# Patient Record
Sex: FEMALE | Race: WHITE | NOT HISPANIC OR LATINO | Employment: OTHER | URBAN - METROPOLITAN AREA
[De-identification: names, ages, dates, MRNs, and addresses within clinical notes are randomized per-mention and may not be internally consistent; named-entity substitution may affect disease eponyms.]

---

## 2018-02-17 ENCOUNTER — HOSPITAL ENCOUNTER (OUTPATIENT)
Facility: HOSPITAL | Age: 53
Discharge: HOME | End: 2018-02-18
Attending: EMERGENCY MEDICINE | Admitting: HOSPITALIST
Payer: COMMERCIAL

## 2018-02-17 DIAGNOSIS — R06.09 DOE (DYSPNEA ON EXERTION): ICD-10-CM

## 2018-02-17 DIAGNOSIS — I20.9 ANGINA PECTORIS: Primary | ICD-10-CM

## 2018-02-17 DIAGNOSIS — R07.9 CHEST PAIN: ICD-10-CM

## 2018-02-17 PROBLEM — F17.200 TOBACCO DEPENDENCY: Status: ACTIVE | Noted: 2018-02-17

## 2018-02-17 PROBLEM — E78.5 HYPERLIPIDEMIA: Status: ACTIVE | Noted: 2018-02-17

## 2018-02-17 LAB
ALBUMIN SERPL BCP-MCNC: 3.8 G/DL
ALP SERPL-CCNC: 77 U/L
ALT SERPL W/O P-5'-P-CCNC: 35 U/L
ANION GAP SERPL CALC-SCNC: 9 MMOL/L
AST SERPL-CCNC: 17 U/L
BASOPHILS # BLD AUTO: 0.1 K/UL
BASOPHILS NFR BLD: 0.9 %
BILIRUB SERPL-MCNC: 0.3 MG/DL
BUN SERPL-MCNC: 7 MG/DL
CALCIUM SERPL-MCNC: 9.4 MG/DL
CHLORIDE SERPL-SCNC: 111 MMOL/L
CHOLEST SERPL-MCNC: 287 MG/DL
CHOLEST/HDLC SERPL: 8 {RATIO}
CO2 SERPL-SCNC: 23 MMOL/L
CREAT SERPL-MCNC: 0.9 MG/DL
D DIMER PPP IA.FEU-MCNC: 0.24 MG/L FEU
DIFFERENTIAL METHOD: ABNORMAL
EOSINOPHIL # BLD AUTO: 0.3 K/UL
EOSINOPHIL NFR BLD: 5 %
ERYTHROCYTE [DISTWIDTH] IN BLOOD BY AUTOMATED COUNT: 13.8 %
EST. GFR  (AFRICAN AMERICAN): >60 ML/MIN/1.73 M^2
EST. GFR  (NON AFRICAN AMERICAN): >60 ML/MIN/1.73 M^2
GLUCOSE SERPL-MCNC: 120 MG/DL
HCT VFR BLD AUTO: 45.9 %
HDLC SERPL-MCNC: 36 MG/DL
HDLC SERPL: 12.5 %
HGB BLD-MCNC: 15.2 G/DL
LDLC SERPL CALC-MCNC: 191 MG/DL
LYMPHOCYTES # BLD AUTO: 2.5 K/UL
LYMPHOCYTES NFR BLD: 38.4 %
MCH RBC QN AUTO: 29.2 PG
MCHC RBC AUTO-ENTMCNC: 33 G/DL
MCV RBC AUTO: 88 FL
MONOCYTES # BLD AUTO: 0.4 K/UL
MONOCYTES NFR BLD: 6.1 %
NEUTROPHILS # BLD AUTO: 3.3 K/UL
NEUTROPHILS NFR BLD: 49.6 %
NONHDLC SERPL-MCNC: 251 MG/DL
PLATELET # BLD AUTO: 293 K/UL
PMV BLD AUTO: 7.9 FL
POTASSIUM SERPL-SCNC: 3.7 MMOL/L
PROT SERPL-MCNC: 7 G/DL
RBC # BLD AUTO: 5.2 M/UL
SODIUM SERPL-SCNC: 143 MMOL/L
TRIGL SERPL-MCNC: 300 MG/DL
TROPONIN I SERPL DL<=0.01 NG/ML-MCNC: <0.006 NG/ML
TSH SERPL DL<=0.005 MIU/L-ACNC: 1.04 UIU/ML
WBC # BLD AUTO: 6.6 K/UL

## 2018-02-17 PROCEDURE — 85025 COMPLETE CBC W/AUTO DIFF WBC: CPT

## 2018-02-17 PROCEDURE — 80053 COMPREHEN METABOLIC PANEL: CPT

## 2018-02-17 PROCEDURE — 93010 ELECTROCARDIOGRAM REPORT: CPT | Mod: ,,, | Performed by: INTERNAL MEDICINE

## 2018-02-17 PROCEDURE — 36415 COLL VENOUS BLD VENIPUNCTURE: CPT

## 2018-02-17 PROCEDURE — 84443 ASSAY THYROID STIM HORMONE: CPT

## 2018-02-17 PROCEDURE — 80061 LIPID PANEL: CPT

## 2018-02-17 PROCEDURE — G0378 HOSPITAL OBSERVATION PER HR: HCPCS

## 2018-02-17 PROCEDURE — 85379 FIBRIN DEGRADATION QUANT: CPT

## 2018-02-17 PROCEDURE — 25000003 PHARM REV CODE 250: Performed by: EMERGENCY MEDICINE

## 2018-02-17 PROCEDURE — 99284 EMERGENCY DEPT VISIT MOD MDM: CPT

## 2018-02-17 PROCEDURE — 93005 ELECTROCARDIOGRAM TRACING: CPT

## 2018-02-17 PROCEDURE — 84484 ASSAY OF TROPONIN QUANT: CPT | Mod: 91

## 2018-02-17 PROCEDURE — 25000003 PHARM REV CODE 250: Performed by: HOSPITALIST

## 2018-02-17 RX ORDER — POTASSIUM CHLORIDE 20 MEQ/15ML
40 SOLUTION ORAL
Status: DISCONTINUED | OUTPATIENT
Start: 2018-02-17 | End: 2018-02-18 | Stop reason: HOSPADM

## 2018-02-17 RX ORDER — NAPROXEN SODIUM 220 MG/1
81 TABLET, FILM COATED ORAL DAILY
Status: DISCONTINUED | OUTPATIENT
Start: 2018-02-17 | End: 2018-02-18 | Stop reason: HOSPADM

## 2018-02-17 RX ORDER — SODIUM,POTASSIUM PHOSPHATES 280-250MG
2 POWDER IN PACKET (EA) ORAL
Status: DISCONTINUED | OUTPATIENT
Start: 2018-02-17 | End: 2018-02-18 | Stop reason: HOSPADM

## 2018-02-17 RX ORDER — LANOLIN ALCOHOL/MO/W.PET/CERES
800 CREAM (GRAM) TOPICAL
Status: DISCONTINUED | OUTPATIENT
Start: 2018-02-17 | End: 2018-02-18 | Stop reason: HOSPADM

## 2018-02-17 RX ORDER — NITROGLYCERIN 0.4 MG/1
0.4 TABLET SUBLINGUAL EVERY 5 MIN PRN
Status: DISCONTINUED | OUTPATIENT
Start: 2018-02-17 | End: 2018-02-18 | Stop reason: HOSPADM

## 2018-02-17 RX ORDER — IBUPROFEN 200 MG
16 TABLET ORAL
Status: DISCONTINUED | OUTPATIENT
Start: 2018-02-17 | End: 2018-02-18 | Stop reason: HOSPADM

## 2018-02-17 RX ORDER — IBUPROFEN 200 MG
24 TABLET ORAL
Status: DISCONTINUED | OUTPATIENT
Start: 2018-02-17 | End: 2018-02-18 | Stop reason: HOSPADM

## 2018-02-17 RX ORDER — PANTOPRAZOLE SODIUM 40 MG/1
40 TABLET, DELAYED RELEASE ORAL DAILY
Status: DISCONTINUED | OUTPATIENT
Start: 2018-02-17 | End: 2018-02-18 | Stop reason: HOSPADM

## 2018-02-17 RX ORDER — GLUCAGON 1 MG
1 KIT INJECTION
Status: DISCONTINUED | OUTPATIENT
Start: 2018-02-17 | End: 2018-02-18 | Stop reason: HOSPADM

## 2018-02-17 RX ORDER — SODIUM CHLORIDE 0.9 % (FLUSH) 0.9 %
5 SYRINGE (ML) INJECTION
Status: DISCONTINUED | OUTPATIENT
Start: 2018-02-17 | End: 2018-02-18 | Stop reason: HOSPADM

## 2018-02-17 RX ORDER — ACETAMINOPHEN 325 MG/1
650 TABLET ORAL EVERY 6 HOURS PRN
Status: DISCONTINUED | OUTPATIENT
Start: 2018-02-17 | End: 2018-02-18 | Stop reason: HOSPADM

## 2018-02-17 RX ORDER — ONDANSETRON 2 MG/ML
8 INJECTION INTRAMUSCULAR; INTRAVENOUS EVERY 8 HOURS PRN
Status: DISCONTINUED | OUTPATIENT
Start: 2018-02-17 | End: 2018-02-18 | Stop reason: HOSPADM

## 2018-02-17 RX ORDER — ONDANSETRON 2 MG/ML
4 INJECTION INTRAMUSCULAR; INTRAVENOUS EVERY 8 HOURS PRN
Status: DISCONTINUED | OUTPATIENT
Start: 2018-02-17 | End: 2018-02-18 | Stop reason: HOSPADM

## 2018-02-17 RX ORDER — REGADENOSON 0.08 MG/ML
0.4 INJECTION, SOLUTION INTRAVENOUS ONCE
Status: DISCONTINUED | OUTPATIENT
Start: 2018-02-17 | End: 2018-02-18 | Stop reason: HOSPADM

## 2018-02-17 RX ADMIN — ASPIRIN 81 MG CHEWABLE TABLET 81 MG: 81 TABLET CHEWABLE at 01:02

## 2018-02-17 RX ADMIN — ACETAMINOPHEN 650 MG: 325 TABLET, FILM COATED ORAL at 08:02

## 2018-02-17 RX ADMIN — NITROGLYCERIN 1 INCH: 20 OINTMENT TOPICAL at 10:02

## 2018-02-17 RX ADMIN — PANTOPRAZOLE SODIUM 40 MG: 40 TABLET, DELAYED RELEASE ORAL at 01:02

## 2018-02-17 RX ADMIN — ACETAMINOPHEN 650 MG: 325 TABLET, FILM COATED ORAL at 01:02

## 2018-02-17 NOTE — ASSESSMENT & PLAN NOTE
Dangers of cigarette smoking were reviewed with patient in detail for 10 minutes and patient was encouraged to quit. Nicotine replacement options were discussed.

## 2018-02-17 NOTE — LETTER
February 18, 2018    Natalee Haynes  [address] [place] [address]            [address] [place] [id number]  Phone: [phone]  Fax: [phone] February 18, 2018     Patient: Natalee Haynes   YOB: 1965   Date of Visit: 2/17/2018       To Whom It May Concern:    It is my medical opinion that Natalee Haynes may return to full duty immediately with no restrictions on 02/22/18.    If you have any questions or concerns, please don't hesitate to call.    Sincerely,        Lilo Washburn RN      negative

## 2018-02-17 NOTE — ED PROVIDER NOTES
"Encounter Date: 2018    SCRIBE #1 NOTE: I, Dyllan Alfaro, am scribing for, and in the presence of, Dr. Soto.       History     Chief Complaint   Patient presents with    Chest Pain     left chest, started a few days ago "twinges that felt like pressure"  starts pain radiates to neck and back that feels heavy. reports pressure 150/90 which is high for her       2018 10:03 AM     Chief complaint: Chest Pain      Natalee Haynes is a 52 y.o. female with a PMHx of Ovarian CA, Migraines, and reflux who presents to the ED c/o chest pain. She states the pain began last night and radiates into her neck and around her left chest. The pain last for a couple minutes. She endorses mild SOB. She denies nausea and vomiting. She states her blood pressure has been high recently in the 180s/100s. She reports smoking a half pack a day. He father  from a heart attack at 63. She is allergic to Phenergan.       The history is provided by the patient.     Review of patient's allergies indicates:   Allergen Reactions    Phenergan [promethazine]      Past Medical History:   Diagnosis Date    Migraine headache     Ovarian ca      Past Surgical History:   Procedure Laterality Date    CHOLECYSTECTOMY      HYSTERECTOMY       No family history on file.  Social History   Substance Use Topics    Smoking status: Not on file    Smokeless tobacco: Not on file    Alcohol use Not on file     Review of Systems   Constitutional: Negative for activity change, appetite change, chills, fatigue and fever.   Eyes: Negative for visual disturbance.   Respiratory: Positive for shortness of breath (mild). Negative for apnea.    Cardiovascular: Positive for chest pain. Negative for palpitations.   Gastrointestinal: Negative for abdominal distention, abdominal pain, nausea and vomiting.   Genitourinary: Negative for difficulty urinating.   Musculoskeletal: Negative for neck pain.   Skin: Negative for pallor and rash.   Neurological: " Negative for headaches.   Hematological: Does not bruise/bleed easily.   Psychiatric/Behavioral: Negative for agitation.       Physical Exam     Initial Vitals [02/17/18 0953]   BP Pulse Resp Temp SpO2   136/67 103 18 98.2 °F (36.8 °C) 100 %      MAP       90         Physical Exam    Nursing note and vitals reviewed.  Constitutional: She appears well-developed and well-nourished.   HENT:   Head: Normocephalic and atraumatic.   Eyes: Conjunctivae are normal.   Neck: Normal range of motion. Neck supple.   Cardiovascular: Regular rhythm and normal heart sounds. Tachycardia present.  Exam reveals no gallop and no friction rub.    No murmur heard.  Pulmonary/Chest: Effort normal and breath sounds normal. No respiratory distress. She has no wheezes. She has no rhonchi. She has no rales. She exhibits no tenderness.   Abdominal: Soft. She exhibits no distension. There is no tenderness.   Musculoskeletal: Normal range of motion.   Neurological: She is alert and oriented to person, place, and time.   Skin: Skin is warm and dry. No erythema.   Psychiatric: She has a normal mood and affect.         ED Course   Procedures  Labs Reviewed   CBC W/ AUTO DIFFERENTIAL   COMPREHENSIVE METABOLIC PANEL   D DIMER, QUANTITATIVE   TROPONIN I     EKG Readings: (Independently Interpreted)   Rhythm: Normal Sinus Rhythm. Heart Rate: 94. Ectopy: No Ectopy. Conduction: Normal. ST Segments: Non-Specific ST Segment Depression. T Waves: Normal. Axis: Normal. Clinical Impression: Normal Sinus Rhythm          Medical Decision Making:   History:   Old Medical Records: I decided to obtain old medical records.  Clinical Tests:   Lab Tests: Ordered and Reviewed  Radiological Study: Ordered and Reviewed  Medical Tests: Ordered and Reviewed  ED Management:  Natalee Haynes is a 52 y.o. female who presents with  intermittent retrosternal pain which radiates to her left neck.  She has multiple risk factors concerns for ACS.  EKG fails to demonstrate any  evidence of ischemia/MI with a negative troponin.  She will be admitted for serial enzymes.  There is no evidence of aortic dissection.  She has a negative d-dimer with no hypoxia or tachycardia with pulmonary emboli unlikely.  Other:   I have discussed this case with another health care provider.       <> Summary of the Discussion: Discussed with Dr. Rios who will admit the patient       APC / Resident Notes:   I, Dr. Jose R Soto III, personally performed the services described in this documentation. All medical record entries made by the scribe were at my direction and in my presence.  I have reviewed the chart and agree that the record reflects my personal performance and is accurate and complete. Jose R Soto III, MD.  11:25 AM 02/17/2018       Scribe Attestation:   Scribe #1: I performed the above scribed service and the documentation accurately describes the services I performed. I attest to the accuracy of the note.               Clinical Impression:   Diagnoses of Chest pain and Chest pain were pertinent to this visit.                           Jose R Soto III, MD  02/17/18 1935

## 2018-02-17 NOTE — ASSESSMENT & PLAN NOTE
Telemetry.   Nitro patch and  mg in ED. ASA 81 mg daily.   NPO for stress test in am.   Check echo.   Donato SL as needed.   Trend troponin.   ROMIE score 2. Recommend cardiac stress test due to early family death from MI.

## 2018-02-17 NOTE — HPI
"Natalee Haynes is a 52 y.o. female with a PMH  of Ovarian CA, Migraines, seasonally allergies and hyperlipidemia who presents to the ED for evaluation of midsteral chest "pressure... Heaviness...twinges 6/10" radiating to the left breast and neck intermittently for 2 days. She reports mild dyspnea associated with the "chest heaviness". She denies increased pain with exertion or dizziness. She states increased blood pressure, 180/100s  for the last couple of days. She reports increased stress at work. She did not take any medications prior to ED evaluation for alleviation of symptoms. Patient with cardiac risk factors: father early death with MI, Hyperlipidemia, smoker, and likely hypertension.   "

## 2018-02-17 NOTE — H&P
"[facility] Hospital Medicine  History & Physical    Patient Name: Natalee Haynes  MRN: [id number]  Admission Date: 2/17/2018  Attending Physician: Tamera Red MD   Primary Care Provider: Primary Doctor No         Patient information was obtained from patient and ER records.     Subjective:     Principal Problem:Angina pectoris    Chief Complaint:   Chief Complaint   Patient presents with    Chest Pain     left chest, started a few days ago "twinges that felt like pressure"  starts pain radiates to neck and back that feels heavy. reports pressure 150/90 which is high for her        HPI: Natalee Haynes is a 52 y.o. female with a PMH  of Ovarian CA, Migraines, seasonally allergies and hyperlipidemia who presents to the ED for evaluation of midsteral chest "pressure... Heaviness...twinges 6/10" radiating to the left breast and neck intermittently for 2 days. She reports mild dyspnea associated with the "chest heaviness". She denies increased pain with exertion or dizziness. She states increased blood pressure, 180/100s  for the last couple of days. She reports increased stress at work. She did not take any medications prior to ED evaluation for alleviation of symptoms. Patient with cardiac risk factors: father early death with MI, Hyperlipidemia, smoker, and likely hypertension.     Past Medical History:   Diagnosis Date    Hyperlipemia     Migraine headache     Ovarian ca        Past Surgical History:   Procedure Laterality Date    CHOLECYSTECTOMY      HYSTERECTOMY         Review of patient's allergies indicates:   Allergen Reactions    Phenergan [promethazine]        No current facility-administered medications on file prior to encounter.      No current outpatient prescriptions on file prior to encounter.     Family History     Problem Relation (Age of Onset)    Diabetes Mother    Early death Mother, Father    Heart disease Father    Hyperlipidemia Father        Social History Main " Topics    Smoking status: Current Every Day Smoker     Packs/day: 0.50     Years: 35.00     Start date: 2/17/1980    Smokeless tobacco: Never Used    Alcohol use Yes      Comment: occasionally    Drug use: No    Sexual activity: Not on file     Review of Systems   Constitutional: Negative for diaphoresis, fatigue and fever.   HENT: Positive for postnasal drip. Negative for congestion and rhinorrhea.    Eyes: Negative for photophobia and visual disturbance.   Respiratory: Positive for shortness of breath. Negative for cough.    Cardiovascular: Positive for chest pain. Negative for leg swelling.   Gastrointestinal: Negative for abdominal distention and abdominal pain.   Endocrine: Negative for polyphagia and polyuria.   Genitourinary: Negative for dysuria and flank pain.   Musculoskeletal: Negative for arthralgias and gait problem.   Skin: Negative for rash.   Neurological: Negative for speech difficulty and numbness.     Objective:     Vital Signs (Most Recent):  Temp: 97.7 °F (36.5 °C) (02/17/18 1300)  Pulse: 96 (02/17/18 1308)  Resp: 16 (02/17/18 1300)  BP: 102/60 (02/17/18 1300)  SpO2: 97 % (02/17/18 1300) Vital Signs (24h Range):  Temp:  [97.7 °F (36.5 °C)-98.2 °F (36.8 °C)] 97.7 °F (36.5 °C)  Pulse:  [] 96  Resp:  [16-18] 16  SpO2:  [93 %-100 %] 97 %  BP: (102-150)/(56-80) 102/60     Weight: 84.2 kg (185 lb 10 oz)  Body mass index is 26.63 kg/m².    Physical Exam   Constitutional: She is oriented to person, place, and time. She appears well-developed and well-nourished.   HENT:   Head: Normocephalic and atraumatic.   Right Ear: External ear normal.   Left Ear: External ear normal.   Mouth/Throat: Oropharynx is clear and moist.   Eyes: Conjunctivae and EOM are normal. Pupils are equal, round, and reactive to light.   Neck: Normal range of motion. Neck supple.   Cardiovascular: Normal rate, regular rhythm, normal heart sounds and intact distal pulses.    No murmur heard.  Pulmonary/Chest: Effort normal  and breath sounds normal.   Abdominal: Soft. Bowel sounds are normal. There is no tenderness.   Musculoskeletal: Normal range of motion. She exhibits no edema.   Neurological: She is alert and oriented to person, place, and time. No cranial nerve deficit.   Skin: Skin is warm and dry.   Psychiatric: She has a normal mood and affect. Her behavior is normal. Judgment normal.   Nursing note and vitals reviewed.        CRANIAL NERVES     CN III, IV, VI   Pupils are equal, round, and reactive to light.  Extraocular motions are normal.        Significant Labs:   BMP:   Recent Labs  Lab 02/17/18  1034   *      K 3.7   *   CO2 23   BUN 7   CREATININE 0.9   CALCIUM 9.4     CBC:   Recent Labs  Lab 02/17/18  1034   WBC 6.60   HGB 15.2   HCT 45.9        Lipid Panel: No results for input(s): CHOL, HDL, LDLCALC, TRIG, CHOLHDL in the last 48 hours.  Troponin:   Recent Labs  Lab 02/17/18  1034   TROPONINI <0.006       Significant Imaging: I have reviewed and interpreted all pertinent imaging results/findings within the past 24 hours.    Assessment/Plan:     * Angina pectoris    Telemetry.   Nitro patch and  mg in ED. ASA 81 mg daily.   NPO for stress test in am.   Check echo.   Donato SL as needed.   Trend troponin.   ROMIE score 3. Recommend cardiac stress test due to early family death from MI.          Tobacco dependency    Dangers of cigarette smoking were reviewed with patient in detail for 10 minutes and patient was encouraged to quit. Nicotine replacement options were discussed.            Hyperlipidemia      Check lipids. Currently not on pharmacological agent.             VTE Risk Mitigation         Ordered     Low Risk of VTE  Once      02/17/18 1304         Discussed POC with patient.   Time spent seeing patient( greater than 1/2 spent in direct contact) : 60 min    Melissa Garibay NP  Department of Hospital Medicine   [facility]

## 2018-02-17 NOTE — SUBJECTIVE & OBJECTIVE
Past Medical History:   Diagnosis Date    Hyperlipemia     Migraine headache     Ovarian ca        Past Surgical History:   Procedure Laterality Date    CHOLECYSTECTOMY      HYSTERECTOMY         Review of patient's allergies indicates:   Allergen Reactions    Phenergan [promethazine]        No current facility-administered medications on file prior to encounter.      No current outpatient prescriptions on file prior to encounter.     Family History     Problem Relation (Age of Onset)    Diabetes Mother    Early death Mother, Father    Heart disease Father    Hyperlipidemia Father        Social History Main Topics    Smoking status: Current Every Day Smoker     Packs/day: 0.50     Years: 35.00     Start date: 2/17/1980    Smokeless tobacco: Never Used    Alcohol use Yes      Comment: occasionally    Drug use: No    Sexual activity: Not on file     Review of Systems   Constitutional: Negative for diaphoresis, fatigue and fever.   HENT: Positive for postnasal drip. Negative for congestion and rhinorrhea.    Eyes: Negative for photophobia and visual disturbance.   Respiratory: Positive for shortness of breath. Negative for cough.    Cardiovascular: Positive for chest pain. Negative for leg swelling.   Gastrointestinal: Negative for abdominal distention and abdominal pain.   Endocrine: Negative for polyphagia and polyuria.   Genitourinary: Negative for dysuria and flank pain.   Musculoskeletal: Negative for arthralgias and gait problem.   Skin: Negative for rash.   Neurological: Negative for speech difficulty and numbness.     Objective:     Vital Signs (Most Recent):  Temp: 97.7 °F (36.5 °C) (02/17/18 1300)  Pulse: 96 (02/17/18 1308)  Resp: 16 (02/17/18 1300)  BP: 102/60 (02/17/18 1300)  SpO2: 97 % (02/17/18 1300) Vital Signs (24h Range):  Temp:  [97.7 °F (36.5 °C)-98.2 °F (36.8 °C)] 97.7 °F (36.5 °C)  Pulse:  [] 96  Resp:  [16-18] 16  SpO2:  [93 %-100 %] 97 %  BP: (102-150)/(56-80) 102/60     Weight:  84.2 kg (185 lb 10 oz)  Body mass index is 26.63 kg/m².    Physical Exam   Constitutional: She is oriented to person, place, and time. She appears well-developed and well-nourished.   HENT:   Head: Normocephalic and atraumatic.   Right Ear: External ear normal.   Left Ear: External ear normal.   Mouth/Throat: Oropharynx is clear and moist.   Eyes: Conjunctivae and EOM are normal. Pupils are equal, round, and reactive to light.   Neck: Normal range of motion. Neck supple.   Cardiovascular: Normal rate, regular rhythm, normal heart sounds and intact distal pulses.    No murmur heard.  Pulmonary/Chest: Effort normal and breath sounds normal.   Abdominal: Soft. Bowel sounds are normal. There is no tenderness.   Musculoskeletal: Normal range of motion. She exhibits no edema.   Neurological: She is alert and oriented to person, place, and time. No cranial nerve deficit.   Skin: Skin is warm and dry.   Psychiatric: She has a normal mood and affect. Her behavior is normal. Judgment normal.   Nursing note and vitals reviewed.        CRANIAL NERVES     CN III, IV, VI   Pupils are equal, round, and reactive to light.  Extraocular motions are normal.        Significant Labs:   BMP:   Recent Labs  Lab 02/17/18  1034   *      K 3.7   *   CO2 23   BUN 7   CREATININE 0.9   CALCIUM 9.4     CBC:   Recent Labs  Lab 02/17/18  1034   WBC 6.60   HGB 15.2   HCT 45.9        Lipid Panel: No results for input(s): CHOL, HDL, LDLCALC, TRIG, CHOLHDL in the last 48 hours.  Troponin:   Recent Labs  Lab 02/17/18  1034   TROPONINI <0.006       Significant Imaging: I have reviewed and interpreted all pertinent imaging results/findings within the past 24 hours.

## 2018-02-18 VITALS
SYSTOLIC BLOOD PRESSURE: 137 MMHG | RESPIRATION RATE: 18 BRPM | BODY MASS INDEX: 26.57 KG/M2 | HEIGHT: 70 IN | TEMPERATURE: 97 F | HEART RATE: 84 BPM | DIASTOLIC BLOOD PRESSURE: 69 MMHG | WEIGHT: 185.63 LBS | OXYGEN SATURATION: 99 %

## 2018-02-18 LAB
DIASTOLIC DYSFUNCTION: NO
DIASTOLIC DYSFUNCTION: NO
GLOBAL PERICARDIAL EFFUSION: ABNORMAL
RETIRED EF AND QEF - SEE NOTES: 60 (ref 55–65)
TRICUSPID VALVE REGURGITATION: ABNORMAL

## 2018-02-18 PROCEDURE — 25000003 PHARM REV CODE 250: Performed by: EMERGENCY MEDICINE

## 2018-02-18 PROCEDURE — G0378 HOSPITAL OBSERVATION PER HR: HCPCS

## 2018-02-18 PROCEDURE — 63600175 PHARM REV CODE 636 W HCPCS: Performed by: INTERNAL MEDICINE

## 2018-02-18 PROCEDURE — 63600175 PHARM REV CODE 636 W HCPCS

## 2018-02-18 RX ORDER — LISINOPRIL 5 MG/1
5 TABLET ORAL DAILY
Qty: 90 TABLET | Refills: 1 | Status: SHIPPED | OUTPATIENT
Start: 2018-02-18 | End: 2019-02-18

## 2018-02-18 RX ORDER — NAPROXEN SODIUM 220 MG/1
81 TABLET, FILM COATED ORAL DAILY
Refills: 0 | COMMUNITY
Start: 2018-02-18 | End: 2019-02-18

## 2018-02-18 RX ORDER — ROSUVASTATIN CALCIUM 40 MG/1
40 TABLET, COATED ORAL NIGHTLY
Qty: 90 TABLET | Refills: 3 | Status: SHIPPED | OUTPATIENT
Start: 2018-02-18 | End: 2019-02-18

## 2018-02-18 RX ORDER — REGADENOSON 0.08 MG/ML
INJECTION, SOLUTION INTRAVENOUS
Status: COMPLETED
Start: 2018-02-18 | End: 2018-02-18

## 2018-02-18 RX ORDER — ROSUVASTATIN CALCIUM 20 MG/1
40 TABLET, COATED ORAL NIGHTLY
Status: DISCONTINUED | OUTPATIENT
Start: 2018-02-18 | End: 2018-02-18 | Stop reason: HOSPADM

## 2018-02-18 RX ORDER — ROSUVASTATIN CALCIUM 40 MG/1
40 TABLET, COATED ORAL NIGHTLY
Qty: 90 TABLET | Refills: 3 | Status: SHIPPED | OUTPATIENT
Start: 2018-02-18 | End: 2018-02-18

## 2018-02-18 RX ADMIN — HUMAN ALBUMIN MICROSPHERES AND PERFLUTREN 0.11 MG: 10; .22 INJECTION, SOLUTION INTRAVENOUS at 09:02

## 2018-02-18 RX ADMIN — ACETAMINOPHEN 650 MG: 325 TABLET, FILM COATED ORAL at 08:02

## 2018-02-18 NOTE — PLAN OF CARE
02/18/18 1501   Final Note   Assessment Type Final Discharge Note   Discharge Disposition Home

## 2018-02-18 NOTE — PLAN OF CARE
Problem: Patient Care Overview  Goal: Plan of Care Review  Outcome: Ongoing (interventions implemented as appropriate)  Pt remains NPO after MN for stress test today. No complaints of chest pain voiced during the night.  Call light within reach.

## 2018-02-18 NOTE — NURSING
Lexiscan cardiolite completed with Dr. Casillas. Patient tolerated well. VSS. Patient to radiology via  , report given to DENIZ. Nuclear images in progress.

## 2018-02-18 NOTE — NURSING
Discharge instructions went over with pt. Pt verbalizes understanding and has no new questions at this time. VSS. IV removed, catheter intact. Telemetry monitor removed. AVS printed and given to pt. Pt left via wheelchair

## 2018-02-18 NOTE — PLAN OF CARE
Patient reports independence, denies POA, living will or HH. Pharmacy is [facility], PCP is MARCI Wilcox NP. Patient plans to dc with no needs.     02/18/18 1245   Discharge Assessment   Assessment Type Discharge Planning Assessment   Confirmed/corrected address and phone number on facesheet? Yes   Assessment information obtained from? Patient   Communicated expected length of stay with patient/caregiver yes   Prior to hospitilization cognitive status: Alert/Oriented   Prior to hospitalization functional status: Independent   Current cognitive status: Alert/Oriented   Current Functional Status: Independent   Lives With alone   Able to Return to Prior Arrangements yes   Is patient able to care for self after discharge? Yes   Patient's perception of discharge disposition home or selfcare   Readmission Within The Last 30 Days no previous admission in last 30 days   Patient currently being followed by outpatient case management? No   Patient currently receives any other outside agency services? No   Equipment Currently Used at Home none   Do you have any problems affording any of your prescribed medications? No   Is the patient taking medications as prescribed? yes   Does the patient have transportation home? Yes   Transportation Available family or friend will provide   Does the patient receive services at the [facility]? No   Discharge Plan A Home   Patient/Family In Agreement With Plan yes

## 2018-02-18 NOTE — PLAN OF CARE
Problem: Patient Care Overview  Goal: Plan of Care Review  Outcome: Ongoing (interventions implemented as appropriate)  Pt remained free from injury/falls this shift. VSS- no signs of any distress. Tele([id number])- NSR. POC reviewed with pt. Pt aware of NPO status at midnight tonight for stress tomorrow AM. Pt repositioned independently, skin intact. Bed locked in lowest position, SR upx2, call light within reach. Will continue to monitor.

## 2018-02-19 NOTE — DISCHARGE SUMMARY
"[facility]  Hospital Medicine  Discharge Summary      Patient Name: Natalee Haynes  MRN: [id number]  Admission Date: 2/17/2018  Hospital Length of Stay: 0 days  Discharge Date and Time: 2/18/2018  2:45 PM  Attending Physician: No att. providers found   Discharging Provider: Melissa Garibay NP  Primary Care Provider: Primary Doctor Lata      HPI:   Natalee Haynes is a 52 y.o. female with a PMH  of Ovarian CA, Migraines, seasonally allergies and hyperlipidemia who presents to the ED for evaluation of midsteral chest "pressure... Heaviness...twinges 6/10" radiating to the left breast and neck intermittently for 2 days. She reports mild dyspnea associated with the "chest heaviness". She denies increased pain with exertion or dizziness. She states increased blood pressure, 180/100s  for the last couple of days. She reports increased stress at work. She did not take any medications prior to ED evaluation for alleviation of symptoms. Patient with cardiac risk factors: father early death with MI, Hyperlipidemia, smoker, and likely hypertension.     * No surgery found *      Hospital Course:   Patient monitored overnight on telemetry with no arrhthymias noted. Troponin negative x 3. Lipid panel obtained and demonstrated evidence of hyperlipidemia. She was initiated on crestor. She underwent cardiac stress test which is negative for myocardial ischemia. She is stable for DC.     PE: chest CTA. Heart RRR     Consults:     No new Assessment & Plan notes have been filed under this hospital service since the last note was generated.  Service: Hospital Medicine    Final Active Diagnoses:    Diagnosis Date Noted POA    PRINCIPAL PROBLEM:  Angina pectoris [I20.9] 02/17/2018 Yes    Hyperlipidemia [E78.5] 02/17/2018 Yes    Tobacco dependency [F17.200] 02/17/2018 Yes      Problems Resolved During this Admission:    Diagnosis Date Noted Date Resolved POA       Discharged Condition: stable    Disposition: Home " or Self Care    Follow Up:  Follow-up Information     Primary Doctor No In 1 week.    Why:  hospital follow up               Patient Instructions:     Diet Cardiac     Activity as tolerated     Notify your health care provider if you experience any of the following:  severe uncontrolled pain     Notify your health care provider if you experience any of the following:  persistent nausea and vomiting or diarrhea     Notify your health care provider if you experience any of the following:  persistent dizziness, light-headedness, or visual disturbances     Notify your health care provider if you experience any of the following:  difficulty breathing or increased cough         Significant Diagnostic Studies: Labs: BMP: No results for input(s): GLU, NA, K, CL, CO2, BUN, CREATININE, CALCIUM, MG in the last 48 hours. and CMP No results for input(s): NA, K, CL, CO2, GLU, BUN, CREATININE, CALCIUM, PROT, ALBUMIN, BILITOT, ALKPHOS, AST, ALT, ANIONGAP, ESTGFRAFRICA, EGFRNONAA in the last 48 hours.    Pending Diagnostic Studies:     Procedure Component Value Units Date/Time    NM Myocardial Perfusion Spect Multi Pharmacologic [[id number]] Updated:  02/18/18 1145    Order Status:  Sent Lab Status:  In process          Medications:  Reconciled Home Medications:   Discharge Medication List as of 2/18/2018  2:16 PM      START taking these medications    Details   aspirin 81 MG Chew Take 1 tablet (81 mg total) by mouth once daily., Starting Sun 2/18/2018, Until Mon 2/18/2019, OTC      lisinopril (PRINIVIL,ZESTRIL) 5 MG tablet Take 1 tablet (5 mg total) by mouth once daily., Starting Sun 2/18/2018, Until Mon 2/18/2019, Normal         CONTINUE these medications which have CHANGED    Details   rosuvastatin (CRESTOR) 40 MG Tab Take 1 tablet (40 mg total) by mouth every evening., Starting Sun 2/18/2018, Until Mon 2/18/2019, Normal         CONTINUE these medications which have NOT CHANGED    Details   CETIRIZINE HCL/PSEUDOEPHEDRINE (ZYRTEC-D  ORAL) Take by mouth., Historical Med      TOPIRAMATE (TOPAMAX ORAL) Take by mouth., Historical Med             Indwelling Lines/Drains at time of discharge:   Lines/Drains/Airways          No matching active lines, drains, or airways          Time spent on the discharge of patient: 28 minutes  Patient was seen and examined on the date of discharge and determined to be suitable for discharge.         Melissa Garibay NP  Department of Hospital Medicine  [facility]

## 2018-02-19 NOTE — HOSPITAL COURSE
Patient monitored overnight on telemetry with no arrhthymias noted. Troponin negative x 3. Lipid panel obtained and demonstrated evidence of hyperlipidemia. She was initiated on crestor. She underwent cardiac stress test which is negative for myocardial ischemia. She is stable for DC.     PE: chest CTA. Heart RRR